# Patient Record
(demographics unavailable — no encounter records)

---

## 2025-01-24 NOTE — PHYSICAL EXAM
[de-identified] : The skin is intact there is no evidence of infection but there is swelling ecchymosis and tenderness over the left distal radius with obvious clinical deformity of the left wrist.  There is limited range of motion of flexion extension pronation supination.  No instability of the radial ulnar joint.  The compartments are soft and he is moving the digits well with good capillary refill but positive Tinel's over the median nerve and sensation decreased in the thumb and second digit.  The ulnar nerve distribution is intact. [de-identified] : PA lateral and oblique shows a comminuted dorsal angulated distal radius fracture with intra-articular extension

## 2025-01-24 NOTE — ASSESSMENT
[FreeTextEntry1] : 1 day status post left distal radius intra-articular fracture with dorsal comminution and angulation.  Options were discussed ranging from conservative management, close reduction, or stabilization.  Risk associated with each option was discussed including risk of bleeding infection nerve and tendon injury stiffness pain syndrome instability loss of reduction hardware issues nonunion malunion development of arthritis and other issues associated with both conservative management manipulation and surgical intervention.  Patient appeared understand his risk and all questions answered.  He elects to proceed with open reduction and stabilization of the intra-articular left distal radius fracture and carpal tunnel release.  Vitamin C prophylaxis discussed

## 2025-01-24 NOTE — HISTORY OF PRESENT ILLNESS
[Right] : right hand dominant [FreeTextEntry1] : DOI - 1/23/25, distal radius fracture (left), skateboarding FOOSH injury   Pt presents for new issue - left wrist. Pt states that when skateboarding yesterday evening, he fell and landed on an outstretched hand. Pt went to [facility] immediately after, in which they took XR imaging, notified him of a distal radius fracture, provided him with a splint, without manipulation, and recommended ortho follow up. Pt states he had some numbness in the index and middle fingers following fracture of wrist yesterday.

## 2025-02-07 NOTE — HISTORY OF PRESENT ILLNESS
[___ Days Post Op] : post op day #[unfilled] [de-identified] : DOS: 1/29/25 [de-identified] : 9 days status post open reduction and internal fixation of left distal radius fracture and carpal tunnel release [de-identified] : Incision lines are clean, dry, and intact.  No evidence of infection.  Negative Tinel's.  Patient moving digits well.  Patient's neuro and vascular grossly intact. [de-identified] : PA lateral and oblique of the left wrist: Show hardware in excellent position [de-identified] : 9 days status post open reduction and internal fixation of left distal radius fracture and carpal tunnel release [de-identified] : Sutures were removed Home program outlined to reduce the risk of complications Referral to outside occupational therapy Return to the office in 4 weeks

## 2025-02-07 NOTE — HISTORY OF PRESENT ILLNESS
[___ Days Post Op] : post op day #[unfilled] [de-identified] : DOS: 1/29/25 [de-identified] : 9 days status post open reduction and internal fixation of left distal radius fracture and carpal tunnel release [de-identified] : Incision lines are clean, dry, and intact.  No evidence of infection.  Negative Tinel's.  Patient moving digits well.  Patient's neuro and vascular grossly intact. [de-identified] : PA lateral and oblique of the left wrist: Show hardware in excellent position [de-identified] : 9 days status post open reduction and internal fixation of left distal radius fracture and carpal tunnel release [de-identified] : Sutures were removed Home program outlined to reduce the risk of complications Referral to outside occupational therapy Return to the office in 4 weeks

## 2025-03-07 NOTE — HISTORY OF PRESENT ILLNESS
[de-identified] : DOS: 1/29/25. [de-identified] : 5 weeks 1 day status post open reduction and internal fixation of left distal radius fracture and carpal tunnel release.    [de-identified] : Scar is well-healed no tenderness over the distal radius hardware or carpus.  No evidence of crepitus or instability.  Pronation/supination 80/80 symmetric bilaterally Flexion/extension is 80/60 on the right and 70/50 on the left with soft endpoints.  There is good capillary refill of the digits bilaterally.There is no masses or sensitivity over the median and ulnar nerves at the level of the wrist. There is a negative Tinel's and negative Phalen's sign bilaterally. The sensation is grossly intact bilaterally. [de-identified] : PA lateral and oblique show excellent alignment with hardware in place and small calcifications in the radial column. [de-identified] : 5 weeks 1 day status post open reduction and internal fixation of left distal radius fracture and carpal tunnel release [de-identified] : Patient will continue with therapy and transition to independent home program when patient and therapist feel they are ready  Return to the office in 2 months

## 2025-03-07 NOTE — HISTORY OF PRESENT ILLNESS
[de-identified] : DOS: 1/29/25. [de-identified] : 5 weeks 1 day status post open reduction and internal fixation of left distal radius fracture and carpal tunnel release.    [de-identified] : Scar is well-healed no tenderness over the distal radius hardware or carpus.  No evidence of crepitus or instability.  Pronation/supination 80/80 symmetric bilaterally Flexion/extension is 80/60 on the right and 70/50 on the left with soft endpoints.  There is good capillary refill of the digits bilaterally.There is no masses or sensitivity over the median and ulnar nerves at the level of the wrist. There is a negative Tinel's and negative Phalen's sign bilaterally. The sensation is grossly intact bilaterally. [de-identified] : PA lateral and oblique show excellent alignment with hardware in place and small calcifications in the radial column. [de-identified] : 5 weeks 1 day status post open reduction and internal fixation of left distal radius fracture and carpal tunnel release [de-identified] : Patient will continue with therapy and transition to independent home program when patient and therapist feel they are ready  Return to the office in 2 months